# Patient Record
Sex: MALE | Race: WHITE | HISPANIC OR LATINO | Employment: FULL TIME | URBAN - NONMETROPOLITAN AREA
[De-identification: names, ages, dates, MRNs, and addresses within clinical notes are randomized per-mention and may not be internally consistent; named-entity substitution may affect disease eponyms.]

---

## 2022-02-23 ENCOUNTER — OFFICE VISIT (OUTPATIENT)
Dept: URGENT CARE | Facility: CLINIC | Age: 42
End: 2022-02-23
Payer: COMMERCIAL

## 2022-02-23 VITALS
RESPIRATION RATE: 16 BRPM | HEART RATE: 69 BPM | BODY MASS INDEX: 41.71 KG/M2 | SYSTOLIC BLOOD PRESSURE: 122 MMHG | OXYGEN SATURATION: 98 % | HEIGHT: 69 IN | TEMPERATURE: 97.8 F | DIASTOLIC BLOOD PRESSURE: 78 MMHG | WEIGHT: 281.6 LBS

## 2022-02-23 DIAGNOSIS — S61.209A AVULSION OF SKIN OF FINGER, INITIAL ENCOUNTER: ICD-10-CM

## 2022-02-23 DIAGNOSIS — S61.112A LACERATION OF LEFT THUMB WITHOUT FOREIGN BODY WITH DAMAGE TO NAIL, INITIAL ENCOUNTER: ICD-10-CM

## 2022-02-23 PROCEDURE — 99203 OFFICE O/P NEW LOW 30 MIN: CPT | Performed by: NURSE PRACTITIONER

## 2022-02-23 RX ORDER — ATORVASTATIN CALCIUM 20 MG/1
20 TABLET, FILM COATED ORAL
COMMUNITY
Start: 2022-01-13

## 2022-02-23 RX ORDER — CEPHALEXIN 500 MG/1
500 CAPSULE ORAL 4 TIMES DAILY
Qty: 20 CAPSULE | Refills: 0 | Status: SHIPPED | OUTPATIENT
Start: 2022-02-23 | End: 2022-02-28

## 2022-02-23 RX ORDER — LISINOPRIL 20 MG/1
20 TABLET ORAL
COMMUNITY
Start: 2022-01-13

## 2022-02-23 ASSESSMENT — ENCOUNTER SYMPTOMS
SENSORY CHANGE: 0
BRUISES/BLEEDS EASILY: 0

## 2022-02-23 NOTE — PATIENT INSTRUCTIONS
Lesión En [place]  (Fingertip Laceration)  El tratamiento de las lesiones en la punta de los dedos depende del tamaño del radha y si se ha dañado el hueso o la uña. Los hill de la piel sobre la punta del dedo que charlotte más chicos que arminda moneda generalmente (más pequeño de 1 cm) se curan madeline sin otro tratamiento que limpiar la herida y cambiar el vendaje.  Mantenga la mano elevada rabia 2 ó 3 días, para disminuir la hinchazón y el dolor. Será conveniente que le coloquen arminda tablilla, un aparato ortopédico o arminda venda para proteger la lesión. Si el radha se lashonda desde los lados, remoje el dedo en agua tibia y cambie el vendaje diariamente.   Debera aplicarse la vacuna contra el tetanos si:  · No sabe cuando recibió la última dosis.  · Nunca recibió esta vacuna.  · La herida está sucia.  Si usted necesita aplicarse la vacuna yse niega a recibirla, corre riesgo de contraer tétanos. Ésta es arminda enfermedad grave.  SOLICITE ATENCIÓN MÉDICA SI:  · Tiene signos de infección. aumenta el enrojecimiento, la hichazón y el dolor u observa pus en el drenaje.  Document Released: 12/18/2006 Document Revised: 03/11/2013  ExitCare® Patient Information ©2014 m[employer]ectar.      Cuidado de un desgarro, en adultos  Laceration Care, Adult  Un desgarro es un rdaha que puede atravesar todas las capas de la piel hasta el tejido que se encuentra debajo de la piel. Algunos desgarros cicatrizan por sí solos. Otros se deben cerrar con puntos (suturas), grapas, tiras adhesivas para la piel o goma para cerrar la piel. El cuidado adecuado de un desgarro reduce el riesgo de infección, ayuda a que el desgarro cierre mejor, y puede prevenir la formación de cicatrices.  Cómo cuidar del desgarro  Lávese las georgina con agua y jabón antes de tocarse la herida y cambiar la venda (vendaje). Use desinfectante para georgina si no dispone de agua y jabón.  Mantenga la herida limpia y seca.  Si le colocaron un vendaje, cámbielo al menos arminda vez  al día o madi se lo haya indicado el médico. También debe cambiarlo si se moja o se ensucia.  Si se utilizaron suturas o grapas:  · Mantenga la herida completamente seca rabia las primeras 24 horas o madi se lo haya indicado el médico. Transcurrido jennifer tiempo, puede ducharse o roma carlos de inmersión. No obstante, asegúrese de no sumergir la herida en agua hasta que le hayan quitado las suturas o las grapas.  · Limpie la herida arminda vez por día o madi se lo haya indicado el médico:  ? Lave la herida con agua y jabón.  ? Enjuáguela con agua para quitar todo el jabón.  ? Séquela dando palmaditas con armnida toalla limpia. No frote la herida.  · Después de limpiar la herida, aplique arminda delgada capa de ungüento con antibiótico madi se lo haya indicado el médico. [place] ayudará a prevenir infecciones y a evitar que el vendaje se adhiera a la herida.  · Justin que le retiren las suturas o las grapas madi se lo haya indicado el médico.  Si se utilizaron tiras adhesivas para la piel:  · No deje que las tiras adhesivas para la piel se mojen. Puede bañarse o ducharse, bharathi tenga cuidado de no mojar la herida.  · Si se moja, séquela dando palmaditas con arminda toalla limpia. No frote la herida.  · Las tiras adhesivas para la piel se caen solas. Puede recortar las tiras a medida que la herida se lashonda. No quite las tiras adhesivas para la piel que aún estén pegadas a la herida. Estas se caerán cuando sea el momento.  Si se utilizó goma para cerrar la piel:  · Trate de mantener la herida seca; sin embargo, puede mojarla ligeramente cuando se bañe o se duche. No sumerja la herida en el agua, por ejemplo, al nadar.  · Después de ducharse o bañarse, seque la herida con cuidado dando palmaditas con arminda toalla limpia. No frote la herida.  · No practique actividades que lo celeste transpirar mucho hasta que la goma para cerrar la piel se haya caído vickie.  · No aplique líquidos, cremas ni ungüentos medicinales en la herida mientras todavía tenga  la goma para cerrar la piel. De lo contrario, puede hacer que la goma se despegue antes de que la herida cicatrice.  · Si la herida está cubierta con un vendaje, tenga cuidado de no aplicar cinta adhesiva directamente sobre la goma para cerrar la piel. De lo contrario, puede hacer que la goma se despegue antes de que la herida cicatrice.  · No toque la goma. La goma para cerrar la piel generalmente permanece sobre la piel de 5 a 10 días y luego se  vickie.  Indicaciones generales    · [place] los medicamentos de venta abdiel y los recetados solamente madi se lo haya indicado el médico.  · Si le recetaron un medicamento o ungüento con antibiótico, tómelo o aplíqueselo madi se lo haya indicado el médico. No deje de usarlo aunque la afección mejore.  · No se rasque ni se toque la herida.  · Controle la herida todos los días para detectar signos de infección. Esté atento a lo siguiente:  ? Dolor, hinchazón o enrojecimiento.  ? Líquido, fito o pus.  · Rabia las primeras 24 a 48 horas después de que le hayan reparado el desgarro, cuando esté sentado o acostado, levante (eleve) la farhat de la lesión por encima del nivel del corazón.  · Si se lo indican, aplique hielo sobre la farhat afectada:  ? Ponga el hielo en arminda bolsa plástica.  ? Coloque arminda toalla entre la piel y la bolsa de hielo.  ? Coloque el hielo rabia 20 minutos, 2 a 3 veces por día.  · Concurra a todas las visitas de seguimiento madi se lo haya indicado el médico. [place] es importante.  Comuníquese con un médico si:  · Le colocaron la vacuna antitetánica y tiene hinchazón, dolor intenso, enrojecimiento o hemorragia en el sitio de la inyección.  · Tiene fiebre.  · Arminda herida que estaba cerrada y se abre.  · Percibe que sale mal olor de la herida o del vendaje.  · Nota un cuerpo extraño en la herida, madi un trozo de dias o mitchel.  · El dolor no se tena con los medicamentos.  · Presenta un aumento del enrojecimiento, la hinchazón o el dolor en el lugar de la  herida.  · Presenta líquido, fito o pus que provienen de la herida.  · Debe cambiar el vendaje con frecuencia debido al drenaje de líquido, fito o pus proveniente de la herida.  · Presenta arminda nueva erupción cutánea.  · Presenta adormecimiento alrededor de la herida.  Solicite ayuda de inmediato si:  · Tiene mucha hinchazón alrededor de la herida.  · El dolor aumenta repentinamente y es intenso.  · Presenta nódulos dolorosos cerca de la herida o en la piel en cualquier farhat del cuerpo.  · Tiene arminda línea amado que sale de la herida.  · La herida está en la mano o en el pie y no puede  correctamente alejandro de los dedos.  · La herida está en la mano o en el pie y observa que los dedos tienen un kaylynn pálido o azulado.  Resumen  · Un desgarro es un radha que puede atravesar todas las capas de la piel hasta el tejido que se encuentra debajo de la piel.  · Algunos desgarros cicatrizan por sí solos. Otros se deben cerrar con puntos (suturas), grapas, tiras adhesivas para la piel o goma para cerrar la piel.  · El cuidado adecuado de un desgarro reduce el riesgo de infección, ayuda a que el desgarro cierre mejor, y previene la formación de cicatrices.  Esta información no tiene madi fin reemplazar el consejo del médico. Asegúrese de hacerle al médico cualquier pregunta que tenga.  Document Released: 12/18/2006 Document Revised: 02/21/2019 Document Reviewed: 02/21/2019  Elsevier Patient Education © 2020 Elsevier Inc.

## 2022-02-23 NOTE — LETTER
"EMPLOYEE’S CLAIM FOR COMPENSATION/ REPORT OF INITIAL TREATMENT  FORM C-4    EMPLOYEE’S CLAIM - PROVIDE ALL INFORMATION REQUESTED   First Name  Roberto Last Name  Mirta Birthdate                    1980                Sex  male Claim Number (Insurer’s Use Only)    Home Address  [address] Age  41 y.o. Height  1.74 m (5' 8.5\") Weight  (!) 128 kg (281 lb 9.6 oz) [facility]     [facility] Zip  [address] Telephone  [phone] (home)    Mailing Address  [address] Zip  [address] Primary Language Spoken  English    Insurer  Pablo Third-Party   Pablo   Employee's Occupation (Job Title) When Injury or Occupational Disease Occurred      Employer's Name/Company Name     Telephone  [phone]    Office Mail Address (Number and Street)   [address]  Zip  [address]    Date of Injury  2/21/2022               Hours Injury   Date Employer Notified  2/21/2022 Last Day of Work after Injury     or Occupational Disease  2/23/2022 Supervisor to Whom Injury     Reported     Address or Location of Accident (if applicable)     What were you doing at the time of accident? (if applicable)      How did this injury or occupational disease occur? (Be specific an answer in detail. Use additional sheet if necessary)     If you believe that you have an occupational disease, when did you first have knowledge of the disability and it relationship to your employment?   Witnesses to the Accident        Nature of Injury or Occupational Disease    Part(s) of Body Injured or Affected  , ,     I certify that the above is true and correct to the best of my knowledge and that I have provided this information in order to obtain the benefits of [place] or Chapter 617 of " [place]).  I hereby authorize any physician, chiropractor, surgeon, practitioner, or other person, any hospital, including [facility] or [facility], any medical service organization, any insurance company, or other institution or organization to release to each other, any medical or other information, including benefits paid or payable, pertinent to this injury or disease, except information relative to diagnosis, treatment and/or counseling for AIDS, psychological conditions, alcohol or controlled substances, for which I must give specific authorization.  A Photostat of this authorization shall be as valid as the original.     Date   Place Employee’s Original or  *Electronic Signature   THIS REPORT MUST BE COMPLETED AND MAILED WITHIN 3 WORKING DAYS OF TREATMENT   [facility]  Name of Facility  [facility]   Date  2/23/2022 Diagnosis and Description of Injury or Occupational Disease  (S61.112A) Laceration of left thumb without foreign body with damage to nail, initial encounter  (S61.209A) Avulsion of skin of finger, initial encounter Is there evidence the injured employee was under the influence of alcohol and/or another controlled substance at the time of accident?  ? No ? Yes (if yes, please explain)    Hour  10:25 AM   Diagnoses of Laceration of left thumb without foreign body with damage to nail, initial encounter and Avulsion of skin of finger, initial encounter were pertinent to this visit. No   Treatment  - cephALEXin (KEFLEX) 500 MG Cap; Take 1 Capsule by mouth 4 times a day for 5 days.  Dispense: 20 Capsule; Refill: 0  -Wound Care: Irrigated with NS. Polysporin, xeroform and dressing placed  -NSAID's (ibuprofen) and tylenol as directed for pain and inflammation.   -RICE Therapy: Rest, Ice, Compression, Elevation  -Keep initial dressing in place for 24 hours. Can redress wound using provided xeroform over wound bed.  -Gently clean area with mild soap and  water. Can shower. Do not soak wound in water (dishwater, swimming pool, lake or other bodies of water).  -Finger splint for protection. Keep wound covered, clean, and dry at work.   -Follow up in #2 days     Have you advised the patient to remain off work five days or     more?    X-Ray Findings      ? Yes Indicate dates:   From   To      From information given by the employee, together with medical evidence, can        you directly connect this injury or occupational disease as job incurred?  Yes ? No If no, is the injured employee capable of:  ? full duty  No ? modified duty  Yes   Is additional medical care by a physician indicated?  Yes If Modified Duty, Specify any Limitations / Restrictions  Note D-39   Do you know of any previous injury or disease contributing to this condition or occupational disease?  ? Yes ? No (Explain if yes)                          No   Date  2/23/2022 Print Health Care Provider's   VIKTORIYA Melvin I certify the employer’s copy of  this form was mailed on:   Address  [address] Insurer’s Use Only     [place] [facility] [place] [facility]  [address]    Provider’s Tax ID Number  [id number] Telephone  Dept: [phone]             Health Care Provider’s Original or Electronic Signature  e-ABBEY Ward Degree (MD,DO, DC,PA-C,APRN)   APRN      * Complete and attach Release of Information (Form C-4A) when injured employee signs C-4 Form electronically  ORIGINAL - TREATING HEALTHCARE PROVIDER PAGE 2 - INSURER/TPA PAGE 3 - EMPLOYER PAGE 4 - EMPLOYEE             Form C-4 (rev.08/21)           BRIEF DESCRIPTION OF RIGHTS AND BENEFITS  (Pursuant to [place]RS 616C.050)    Notice of Injury or Occupational Disease (Incident Report Form C-1): If an injury or occupational disease (OD) arises out of and in the course of employment, you must provide written notice to your employer as soon as practicable, but no later than 7 days after the accident or OD. Your employer shall  "maintain a sufficient supply of the required forms.    Claim for Compensation (Form C-4): If medical treatment is sought, the form C-4 is available at the place of initial treatment. A completed \"Claim for Compensation\" (Form C-4) must be filed within 90 days after an accident or OD. The treating physician or chiropractor must, within 3 working days after treatment, complete and mail to the employer, the employer's insurer and third-party , the Claim for Compensation.    Medical Treatment: If you require medical treatment for your on-the-job injury or OD, you may be required to select a physician or chiropractor from a list provided by your workers’ compensation insurer, if it has contracted with an Organization for Managed Care (MCO) or Preferred Provider Organization (PPO) or providers of health care. If your employer has not entered into a contract with an MCO or PPO, you may select a physician or chiropractor from the Panel of Physicians and Chiropractors. Any medical costs related to your industrial injury or OD will be paid by your insurer.    Temporary Total Disability (TTD): If your doctor has certified that you are unable to work for a period of at least 5 consecutive days, or 5 cumulative days in a 20-day period, or places restrictions on you that your employer does not accommodate, you may be entitled to TTD compensation.    Temporary Partial Disability (TPD): If the wage you receive upon reemployment is less than the compensation for TTD to which you are entitled, the insurer may be required to pay you TPD compensation to make up the difference. TPD can only be paid for a maximum of 24 months.    Permanent Partial Disability (PPD): When your medical condition is stable and there is an indication of a PPD as a result of your injury or OD, within 30 days, your insurer must arrange for an evaluation by a rating physician or chiropractor to determine the degree of your PPD. The amount of your " PPD award depends on the date of injury, the results of the PPD evaluation, your age and wage.    Permanent Total Disability (PTD): If you are medically certified by a treating physician or chiropractor as permanently and totally disabled and have been granted a PTD status by your insurer, you are entitled to receive monthly benefits not to exceed 66 2/3% of your average monthly wage. The amount of your PTD payments is subject to reduction if you previously received a lump-sum PPD award.    Vocational Rehabilitation Services: You may be eligible for vocational rehabilitation services if you are unable to return to the job due to a permanent physical impairment or permanent restrictions as a result of your injury or occupational disease.    Transportation and Per Luis Fernando Reimbursement: You may be eligible for travel expenses and per luis fernando associated with medical treatment.    Reopening: You may be able to reopen your claim if your condition worsens after claim closure.     Appeal Process: If you disagree with a written determination issued by the insurer or the insurer does not respond to your request, you may appeal to the Department of Administration, , by following the instructions contained in your determination letter. You must appeal the determination within 70 days from the date of the determination letter at [address], [address], [place], [place] [address], or [address] [place], [place] [address]. If you disagree with the  decision, you may appeal to the Department of Administration, . You must file your appeal within 30 days from the date of the  decision letter at [address], [address], [place], [place] [address], or [address] [facility] [address] [place], [place] [address]. If you disagree with a decision of an , you may file a petition for judicial review with the District Court. You must do so  within 30 days of the Appeal Officer’s decision. You may be represented by an  at your own expense or you may contact the [facility] for possible representation.    [place]  for Injured Workers (NAIW): If you disagree with a  decision, you may request that NAIW represent you without charge at an  Hearing. For information regarding denial of benefits, you may contact the [facility] at: [address], [address], [place] [address], [phone], or [address], [place] [address], [phone]    To File a Complaint with the Division: If you wish to file a complaint with the  of the Division of Industrial Relations (DIR),  please contact the Workers’ Compensation Section, [address], [address], [place], [place] [address], telephone [phone], or [address], [address], [place], [place] [address], telephone [phone].    For assistance with Workers’ Compensation Issues: You may contact the [facility] Office for Consumer Health Assistance, [address], [address], [place], [place] [address], Toll Free 1-160.617.8866, Web site: http://[facility].[place].gov/Programs/FABIAN E-mail: [email or web address]              __________________________________________________________________                                    _________________            Employee Name / Signature                                                                                                                            Date                                                                                                                                                                                                                              D-2 (rev. 10/20)

## 2022-02-23 NOTE — LETTER
[facility]  [address] [place] [address]  Phone:  [phone] - Fax:  [phone]   [facility] Progress Report and Disability Certification  Date of Service: 2/23/2022   No Show:  No  Date / Time of Next Visit: 2/25/2022   Claim Information   Patient Name: Roberto Kirby  Claim Number:     Employer:   [employer]  Date of Injury: 2/21/2022     Insurer / TPA: Pablo  ID / SSN:     Occupation:   Diagnosis: Diagnoses of Laceration of left thumb without foreign body with damage to nail, initial encounter and Avulsion of skin of finger, initial encounter were pertinent to this visit.    Medical Information   Related to Industrial Injury? Yes    Subjective Complaints:  DOI: 02/22/2022. Was using a saw to cut meat at work, when the the meat slipped, causing the blade to catch his left thumb. Has full skin avulsion. Had cleaned the wound and placed liquid bandage. No  Numbness. Has full ROM of thumb. Some continued bleeding. Pain 6/10. Took Tylenol.    Objective Findings: Physical Exam  Musculoskeletal:      Right hand: Laceration and tenderness present. No bony tenderness. Normal range of motion. Normal strength. Normal sensation. Normal capillary refill. Normal pulse.      Comments: Left thumb: skin avulsion to distal tip, 1 x 0.75 cm. Cut off distal tip of nail, no injury to the nailbed. Liquid bandage lifted with soaking of wound, able to lift and remove debris. Scant bleeding. Distal neurovascular intact.    Skin:     General: Skin is warm.      Capillary Refill: Capillary refill takes less than 2 seconds.      Findings: Laceration present.        Pre-Existing Condition(s): None known.   Assessment:   Initial Visit    Status: Additional Care Required  Permanent Disability:No    Plan: Medication    Diagnostics:      Comments:       Disability Information   Status: Released to Restricted Duty    From:   2022  Through: 2022 Restrictions are: Temporary   Physical Restrictions   Sitting:    Standing:    Stooping:    Bending:      Squatting:    Walking:    Climbing:    Pushing:      Pulling:    Other:    Reaching Above Shoulder (L):   Reaching Above Shoulder (R):       Reaching Below Shoulder (L):    Reaching Below Shoulder (R):      Not to exceed Weight Limits   Carrying(hrs):   Weight Limit(lb):   Lifting(hrs):   Weight  Limit(lb):     Comments: -cephALEXin (KEFLEX) 500 MG Cap; Take 1 Capsule by mouth 4 times a day for 5 days.  Dispense: 20 Capsule; Refill: 0  -Wound Care: Irrigated with NS. Polysporin, xeroform and dressing placed  -NSAID's (ibuprofen) and tylenol as directed for pain and inflammation.   -RICE Therapy: Rest, Ice, Compression, Elevation  -Keep initial dressing in place for 24 hours. Can redress wound using provided xeroform over wound bed.  -Gently clean area with mild soap and water. Can shower. Do not soak wound in water (dishwater, swimming pool, lake or other bodies of water).  -Finger splint for protection. Keep wound covered, clean, and dry at work.   -Follow up in #2 days     Follow up sooner for signs of infection (redness, red streaking, pus, foul odor, severe pain, increased swelling or fever) or any other concerns. Follow up emergently for severe uncontrolled pain, neurovascular compromise (decreased sensation, motion, or circulation).      Repetitive Actions   Hands: i.e. Fine Manipulations from Graspin hrs/day  Comments:Left thimb: to be kept clean, dry, and covered. Can wear finger splint for protection.    Feet: i.e. Operating Foot Controls:     Driving / Operate Machinery:     Health Care Provider’s Original or Electronic Signature  VIKTORIYA Melvin Health Care Provider’s Original or Electronic Signature    Mikey Gilman MD         [facility] [address] [address] Clinic Phone Number:  Dept: [phone]   Appointment Time: 10:05 Am Visit Start Time: 10:25 AM   Check-In Time:  10:06 Am Visit Discharge Time: 11:36 AM   Original-Treating Physician or Chiropractor    Page 2-Insurer/TPA    Page 3-Employer    Page 4-Employee

## 2022-02-23 NOTE — PROGRESS NOTES
"  Subjective:     Roberto Kirby is a 41 y.o. 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 Was using a saw to cut meat at work, when the the meat slipped, causing the blade to catch his left thumb. Has full skin avulsion. Had cleaned the wound and placed liquid bandage. No numbness. Has full ROM of thumb. Some continued bleeding. Pain 6/10. Took Tylenol.      services used for visit.     Laceration   His tetanus status is UTD (2020).       No past medical history on file.    No past surgical history on file.    Social History     Socioeconomic History   • Marital status:      Spouse name: Not on file   • Number of children: Not on file   • Years of education: Not on file   • Highest education level: Not on file   Occupational History   • Not on file   Tobacco Use   • Smoking status: Never Smoker   • Smokeless tobacco: Never Used   Vaping Use   • Vaping Use: Never used   Substance and Sexual Activity   • Alcohol use: Not Currently   • Drug use: Never   • Sexual activity: Not on file   Other Topics Concern   • Not on file   Social History Narrative   • Not on file     Social Determinants of Health     Financial Resource Strain: Not on file   Food Insecurity: Not on file   Transportation Needs: Not on file   Physical Activity: Not on file   Stress: Not on file   Social Connections: Not on file   Intimate Partner Violence: Not on file   Housing Stability: Not on file        No family history on file.     No Known Allergies    Review of Systems   Musculoskeletal: Negative for joint pain.   Neurological: Negative for sensory change.   Endo/Heme/Allergies: Does not bruise/bleed easily.   All other systems reviewed and are negative.       Objective:   /78 (BP Location: Left arm, Patient Position: Sitting)   Pulse 69   Temp 36.6 °C (97.8 °F) (Temporal)   Resp 16   Ht 1.74 m (5' 8.5\")  "  Wt (!) 128 kg (281 lb 9.6 oz)   SpO2 98%   BMI 42.19 kg/m²     Physical Exam  Vitals reviewed.   Constitutional:       General: He is not in acute distress.     Appearance: He is well-developed.   Pulmonary:      Effort: Pulmonary effort is normal.   Musculoskeletal:         General: Tenderness and signs of injury present. Normal range of motion.      Right hand: Laceration and tenderness present. No bony tenderness. Normal range of motion. Normal strength. Normal sensation. Normal capillary refill. Normal pulse.      Comments: Left thumb: skin avulsion to distal tip, 1 x 0.75 cm. Cut off distal tip of nail, no injury to the nailbed. Liquid bandage lifted with soaking of wound, able to lift and remove debris. Scant bleeding. Distal neurovascular intact.    Skin:     General: Skin is warm and dry.      Capillary Refill: Capillary refill takes less than 2 seconds.      Findings: Laceration present. No rash.   Neurological:      General: No focal deficit present.      Mental Status: He is alert and oriented to person, place, and time.      GCS: GCS eye subscore is 4. GCS verbal subscore is 5. GCS motor subscore is 6.   Psychiatric:         Mood and Affect: Mood normal.         Speech: Speech normal.         Behavior: Behavior normal.         Thought Content: Thought content normal.         Judgment: Judgment normal.         Assessment/Plan:   1. Laceration of left thumb without foreign body with damage to nail, initial encounter  - cephALEXin (KEFLEX) 500 MG Cap; Take 1 Capsule by mouth 4 times a day for 5 days.  Dispense: 20 Capsule; Refill: 0    2. Avulsion of skin of finger, initial encounter  - cephALEXin (KEFLEX) 500 MG Cap; Take 1 Capsule by mouth 4 times a day for 5 days.  Dispense: 20 Capsule; Refill: 0  -Wound Care: Irrigated with NS. Polysporin, xeroform and dressing placed  -NSAID's (ibuprofen) and tylenol as directed for pain and inflammation.   -RICE Therapy: Rest, Ice, Compression, Elevation  -Keep  initial dressing in place for 24 hours. Can redress wound using provided xeroform over wound bed.  -Gently clean area with mild soap and water. Can shower. Do not soak wound in water (dishwater, swimming pool, lake or other bodies of water).  -Finger splint for protection. Keep wound covered, clean, and dry at work.   -Follow up in #2 days     Follow up sooner for signs of infection (redness, red streaking, pus, foul odor, severe pain, increased swelling or fever) or any other concerns. Follow up emergently for severe uncontrolled pain, neurovascular compromise (decreased sensation, motion, or circulation).    -Tetanus vaccination status reviewed: UTD (2020). Discussed healing by secondary intent.    Differential diagnosis, natural history, supportive care, and indications for immediate follow-up discussed.

## 2022-02-25 ENCOUNTER — OCCUPATIONAL MEDICINE (OUTPATIENT)
Dept: URGENT CARE | Facility: CLINIC | Age: 42
End: 2022-02-25
Payer: COMMERCIAL

## 2022-02-25 VITALS
RESPIRATION RATE: 18 BRPM | DIASTOLIC BLOOD PRESSURE: 80 MMHG | HEIGHT: 69 IN | OXYGEN SATURATION: 97 % | HEART RATE: 89 BPM | WEIGHT: 279.5 LBS | BODY MASS INDEX: 41.4 KG/M2 | TEMPERATURE: 97.4 F | SYSTOLIC BLOOD PRESSURE: 128 MMHG

## 2022-02-25 DIAGNOSIS — S61.112D LACERATION OF LEFT THUMB WITHOUT FOREIGN BODY WITH DAMAGE TO NAIL, SUBSEQUENT ENCOUNTER: ICD-10-CM

## 2022-02-25 DIAGNOSIS — S61.209D AVULSION OF SKIN OF FINGER, SUBSEQUENT ENCOUNTER: ICD-10-CM

## 2022-02-25 PROCEDURE — 99213 OFFICE O/P EST LOW 20 MIN: CPT | Mod: 29 | Performed by: NURSE PRACTITIONER

## 2022-02-25 ASSESSMENT — ENCOUNTER SYMPTOMS
FEVER: 0
SENSORY CHANGE: 0

## 2022-02-25 NOTE — PROGRESS NOTES
"Subjective     Roberto Kirby is a 41 y.o. male who presents with Follow-Up (FV R finger cut)            DOI: 2/22/22. Presents for follow up on wound. Denies any complications. No fever. Denies numbness. Can move thumb without difficulty. Pain 4/10. Has been dressing wound and using thumb splint as directed. Currently taking cephalexin.      Services used for visit, #[id number]      Review of Systems   Constitutional: Negative for fever.   Neurological: Negative for sensory change.   All other systems reviewed and are negative.             Objective     Pulse 89   Temp 36.3 °C (97.4 °F) (Temporal)   Resp 18   Ht 1.74 m (5' 8.5\")   Wt (!) 127 kg (279 lb 8 oz)   SpO2 97%   BMI 41.88 kg/m²      Physical Exam  Vitals reviewed.   Pulmonary:      Effort: No respiratory distress.   Musculoskeletal:         General: Signs of injury present. Normal range of motion.      Left hand: Laceration and tenderness present. No swelling or bony tenderness. Normal range of motion. Normal strength. Normal capillary refill.      Comments: Left thumb: Skin avulsion to distal tip appears to have routine wound healing of the wound bed. Scant serosanguineous drainage. No active bleeding. No erythema or purulence. Slight white maceration to surrounding tissue.  Distal neurovascular intact.   Skin:     General: Skin is warm and dry.      Capillary Refill: Capillary refill takes less than 2 seconds.      Findings: No erythema.   Neurological:      Mental Status: He is alert and oriented to person, place, and time.                                 Assessment & Plan           1. Laceration of left thumb without foreign body with damage to nail, subsequent encounter    2. Avulsion of skin of finger, subsequent encounter    -Continue cephALEXin (KEFLEX) as directed.  -Wound Care: Cleaned with NS. Dressed with polysporin.  -NSAID's (ibuprofen) and tylenol as directed for pain and inflammation.   -RICE Therapy: Rest, " Ice, Compression, Elevation  -Gently clean area with mild soap and water. Can shower. Do not soak wound in water (dishwater, swimming pool, lake or other bodies of water).  -Finger splint for protection. Keep wound covered, clean, and dry at work. Continue to redress wound using provided xeroform over wound bed for another 2 days, avoid moisture to surrounding tissue. Can then use dry non-stick dressing.  -Follow up in #5 days      Follow up sooner for signs of infection (redness, red streaking, pus, foul odor, severe pain, increased swelling or fever) or any other concerns. Follow up emergently for severe uncontrolled pain, neurovascular compromise (decreased sensation, motion, or circulation).

## 2022-02-25 NOTE — LETTER
[facility]  [address] [place] [address]  Phone:  [phone] - Fax:  [phone]   [facility] Progress Report and Disability Certification  Date of Service: 2/25/2022   No Show:  No  Date / Time of Next Visit: 3/2/2022   Claim Information   Patient Name: Roberto Kirby  Claim Number:     Employer:   [employer] Date of Injury: 2/22/2022     Insurer / TPA: [employer]  ID / SSN:     Occupation:   Diagnosis: Diagnoses of Laceration of left thumb without foreign body with damage to nail, subsequent encounter and Avulsion of skin of finger, subsequent encounter were pertinent to this visit.    Medical Information   Related to Industrial Injury? Yes    Subjective Complaints:  DOI: 2/22/22. Presents for follow up on wound. Denies any complications. No fever. Denies numbness. Can move thumb without difficulty. Pain 4/10. Has been dressing wound and using thumb splint as directed. Curently taking cephalexin.        Services used for visit, #[id number]     Objective Findings: Physical Exam  Vitals reviewed.   Pulmonary:      Effort: No respiratory distress.   Musculoskeletal:         General: Signs of injury present. Normal range of motion.      Left hand: Laceration and tenderness present. No swelling or bony tenderness. Normal range of motion. Normal strength. Normal capillary refill.      Comments: Left thumb: Skin avulsion to distal tip appears to have routine wound healing of the wound bed. Scant serosanguineous drainage. No active bleeding. No erythema or purulence. Slight white maceration to surrounding tissue.  Distal neurovascular intact.   Skin:     General: Skin is warm and dry.      Capillary Refill: Capillary refill takes less than 2 seconds.      Findings: No erythema.   Neurological:      Mental Status: He is alert and oriented to person, place, and time.        Pre-Existing Condition(s): None  known.   Assessment:   Condition Improved    Status: Additional Care Required  Permanent Disability:No    Plan: Medication    Diagnostics:      Comments:       Disability Information   Status: Released to Restricted Duty    From:  2/25/2022  Through: 3/2/2022 Restrictions are: Temporary   Physical Restrictions   Sitting:    Standing:    Stooping:    Bending:      Squatting:    Walking:    Climbing:    Pushing:      Pulling:    Other:    Reaching Above Shoulder (L):   Reaching Above Shoulder (R):       Reaching Below Shoulder (L):    Reaching Below Shoulder (R):      Not to exceed Weight Limits   Carrying(hrs):   Weight Limit(lb):   Lifting(hrs):   Weight  Limit(lb):     Comments: -Continue cephALEXin (KEFLEX) as directed.  -Wound Care: Cleaned with NS. Dressed with polysporin.  -NSAID's (ibuprofen) and tylenol as directed for pain and inflammation.   -RICE Therapy: Rest, Ice, Compression, Elevation  -Gently clean area with mild soap and water. Can shower. Do not soak wound in water (dishwater, swimming pool, lake or other bodies of water).  -Finger splint for protection. Keep wound covered, clean, and dry at work. Continue to redress wound using provided xeroform over wound bed for another 2 days, avoid. Can then use dry non-stick dressing.  -Follow up in #5 days      Follow up sooner for signs of infection (redness, red streaking, pus, foul odor, severe pain, increased swelling or fever) or any other concerns. Follow up emergently for severe uncontrolled pain, neurovascular compromise (decreased sensation, motion, or circulation).       Repetitive Actions   Hands: i.e. Fine Manipulations from Grasping:   Comments:Finger splint for protection. Keep wound covered, clean, and dry at work.   Feet: i.e. Operating Foot Controls:     Driving / Operate Machinery:     Health Care Provider’s Original or Electronic Signature  VIKTORIYA Melvin Health Care Provider’s Original or Electronic Signature    Mikey Gilman  MD         Clinic Name / Location: [facility] [address] [address] Clinic Phone Number: Dept: [phone]   Appointment Time: 3:30 Pm Visit Start Time: 3:25 PM   Check-In Time:  3:21 Pm Visit Discharge Time: 3:56 PM   Original-Treating Physician or Chiropractor    Page 2-Insurer/TPA    Page 3-Employer    Page 4-Employee

## 2022-02-26 NOTE — PATIENT INSTRUCTIONS
-Continue cephALEXin (KEFLEX) as directed.  -Wound Care: Cleaned with NS. Dressed with polysporin.  -NSAID's (ibuprofen) and tylenol as directed for pain and inflammation.   -RICE Therapy: Rest, Ice, Compression, Elevation  -Gently clean area with mild soap and water. Can shower. Do not soak wound in water (dishwater, swimming pool, lake or other bodies of water).  -Finger splint for protection. Keep wound covered, clean, and dry at work. Continue to redress wound using provided xeroform over wound bed for another 2 days, avoid moisture to surrounding tissue. Can then use dry non-stick dressing.  -Follow up in #5 days      Follow up sooner for signs of infection (redness, red streaking, pus, foul odor, severe pain, increased swelling or fever) or any other concerns. Follow up emergently for severe uncontrolled pain, neurovascular compromise (decreased sensation, motion, or circulation).       Lesión En [place]  (Fingertip Laceration)  El tratamiento de las lesiones en la punta [place] depende del tamaño del radha y si se ha dañado el hueso o la uña. Los hill de la piel sobre la punta del dedo que [place] más chicos que arminda moneda generalmente (más pequeño de 1 cm) se curan madeline sin otro tratamiento que limpiar la herida y cambiar el vendaje.  Mantenga la mano elevada rabia 2 ó 3 días, para disminuir la hinchazón y el dolor. Será conveniente que le coloquen arminda tablilla, un aparato ortopédico o arminda venda para proteger la lesión. Si el radha se lashonda desde los lados, remoje el dedo en agua tibia y cambie el vendaje diariamente.   Debera aplicarse la vacuna contra el tetanos si:  · No sabe cuando recibió la última dosis.  · Nunca recibió esta vacuna.  · La herida está sucia.  Si usted necesita aplicarse la vacuna yse niega a recibirla, corre riesgo de contraer tétanos. Ésta es arminda enfermedad grave.  SOLICITE ATENCIÓN MÉDICA SI:  · Tiene signos de infección. aumenta el enrojecimiento, la hichazón y el  dolor u observa pus en el drenaje.  Document Released: 12/18/2006 Document Revised: 03/11/2013  ExitCare® Patient Information ©2014 [employer].      Cuidado de las heridas, en adultos  Wound Care, Adult  El cuidado correcto de las heridas puede ayudar a evitar el dolor y a prevenir las infecciones y formación de cicatrices. Además, puede ayudar a que la cicatrización sea más rápida.  Cómo cuidar la herida  Cuidados de la herida         · Siga las instrucciones del médico acerca del cuidado de la herida. Asegúrese de hacer lo siguiente:  ? Lávese las georgina con agua y jabón antes de cambiar la venda (vendaje). Use desinfectante para georgina si no dispone de agua y jabón.  ? Cambie el vendaje madi se lo haya indicado el médico.  ? No retire los puntos (suturas), la goma para cerrar la piel o las tiras adhesivas. Es posible que estos cierres cutáneos deban quedar puestos en la piel rabia 2 semanas o más tiempo. Si los bordes de las tiras adhesivas empiezan a despegarse y enroscarse, puede recortar los que estén sueltos. No retire las tiras adhesivas por completo a menos que el médico se lo indique.  · Controle la farhat de la herida todos los días para detectar signos de infección. Esté atento a los siguientes signos:  ? Enrojecimiento, hinchazón o dolor.  ? Líquido o fito.  ? Calor.  ? Pus o mal olor.  · Pregúntele al médico si debe limpiar la herida con agua y jabón suave. Hacer esto puede incluir lo siguiente:  ? Usar arminda toalla limpia para secar la herida dando palmaditas después de limpiarla. No se frote ni restriegue la herida.  ? Aplicar arminda crema o un ungüento. Hágalo únicamente madi se lo haya indicado el médico.  ? Cubrir la incisión con un vendaje limpio.  · Pregúntele al médico cuándo puede dejar la herida al descubierto.  · Mantenga el vendaje seco hasta que el médico le diga que se lo puede quitar. No tome carlos de inmersión, nade, use el jacuzzi ni luis eduardo ninguna actividad en la que sumerja la herida en  agua hasta que el médico lo autorice. Pregúntele al médico si puede ducharse. Jairo vez solo le permitan darse carlos de esponja.  Medicamentos    · Si le recetaron un antibiótico, arminda crema o un ungüento con antibiótico, tómelo o aplíqueselo madi se lo haya indicado el médico. No deje de roma o de usar el antibiótico, aunque la afección mejore.  · Madill los medicamentos de venta abdiel y los recetados solamente madi se lo haya indicado el médico. Si le recetaron un analgésico, tómelo al menos 30 minutos antes de realizar el cuidado de la herida, madi se lo haya indicado el médico.  Instrucciones generales  · Reanude jonn actividades normales según lo indicado por el médico. Pregúntele al médico qué actividades son seguras.  · No se rasque ni se toque la herida.  · No consuma ningún producto que contenga nicotina o tabaco, madi cigarrillos y cigarrillos electrónicos. El consumo de esos productos puede demorar la cicatrización de la herida. Si necesita ayuda para dejar de fumar, consulte al médico.  · Concurra a todas las visitas de seguimiento madi se lo haya indicado el médico. [place] es importante.  · Siga arminda dieta que incluya proteínas, vitaminas A y C y otros alimentos ricos en nutrientes que ayudarán a que la herida cicatrice.  ? Los alimentos ricos en proteínas incluyen carne, productos lácteos, frijoles y nueces, entre otras vargas de proteína.  ? Los alimentos ricos en vitamina A incluyen zanahorias y verduras de hoja yuri oscuro.  ? Los alimentos ricos en vitamina C incluyen cítricos, tomates y otras frutas y verduras.  ? Los alimentos ricos en nutrientes tienen proteínas, carbohidratos, grasas, vitaminas o minerales. Consuma arminda variedad de alimentos saludables, que incluya frutas, verduras y cereales integrales.  Comuníquese con un médico si:  · Le aplicaron la antitetánica y tiene hinchazón, dolor intenso, enrojecimiento o hemorragia en el sitio de la inyección.  · El dolor no se tena con los  medicamentos.  · Tiene enrojecimiento, hinchazón o dolor alrededor de la herida.  · Observa líquido o fito que sale de la herida.  · La herida se siente caliente al tacto.  · Tiene pus o percibe mal olor que emana de la herida.  · Tiene fiebre o siente escalofríos.  · Siente náuseas o vomita.  · Tiene mareos.  Solicite ayuda de inmediato si:  · Tiene arminda línea amado que sale de la herida.  · Los bordes de la herida se abren y se separan.  · La herida sangra y el sangrado no se detiene con arminda presión suave.  · Tiene arminda erupción cutánea.  · Se desmaya.  · Tiene dificultad para respirar.  Resumen  · Siempre lávese las georgina con agua y jabón antes de cambiar la venda (vendaje).  · Para ayudar con la cicatrización, coma alimentos ricos en proteínas, vitaminas A y C y demás nutrientes.  · Controle la herida todos los días para detectar signos de infección. Comuníquese con el médico si sospecha que la herida está infectada.  Esta información no tiene madi fin reemplazar el consejo del médico. Asegúrese de hacerle al médico cualquier pregunta que tenga.  Document Released: 08/16/2012 Document Revised: 04/01/2019 Document Reviewed: 07/04/2017  Elsevier Patient Education © 2020 [employer]